# Patient Record
Sex: FEMALE
[De-identification: names, ages, dates, MRNs, and addresses within clinical notes are randomized per-mention and may not be internally consistent; named-entity substitution may affect disease eponyms.]

---

## 2023-08-14 ENCOUNTER — DASHBOARD ENCOUNTERS (OUTPATIENT)
Age: 63
End: 2023-08-14

## 2023-08-14 ENCOUNTER — WEB ENCOUNTER (OUTPATIENT)
Dept: URBAN - METROPOLITAN AREA CLINIC 98 | Facility: CLINIC | Age: 63
End: 2023-08-14

## 2023-08-14 ENCOUNTER — OFFICE VISIT (OUTPATIENT)
Dept: URBAN - METROPOLITAN AREA CLINIC 98 | Facility: CLINIC | Age: 63
End: 2023-08-14
Payer: COMMERCIAL

## 2023-08-14 VITALS
SYSTOLIC BLOOD PRESSURE: 120 MMHG | TEMPERATURE: 97.4 F | BODY MASS INDEX: 23.43 KG/M2 | HEART RATE: 53 BPM | DIASTOLIC BLOOD PRESSURE: 69 MMHG | WEIGHT: 145.8 LBS | HEIGHT: 66 IN

## 2023-08-14 DIAGNOSIS — Z12.11 COLON CANCER SCREENING: ICD-10-CM

## 2023-08-14 DIAGNOSIS — K64.8 HEMORRHOIDS, INTERNAL: ICD-10-CM

## 2023-08-14 PROCEDURE — 99203 OFFICE O/P NEW LOW 30 MIN: CPT | Performed by: INTERNAL MEDICINE

## 2023-08-14 RX ORDER — WHEAT DEXTRIN 3 G/3.5 G
AS DIRECTED POWDER (GRAM) ORAL
OUTPATIENT

## 2023-08-14 RX ORDER — ZINC GLUCONATE 50 MG
1 TABLET TABLET ORAL ONCE A DAY
Status: ACTIVE | COMMUNITY

## 2023-08-14 RX ORDER — SODIUM, POTASSIUM,MAG SULFATES 17.5-3.13G
354ML SOLUTION, RECONSTITUTED, ORAL ORAL ONCE
Qty: 354 MILLILITER | Refills: 0 | OUTPATIENT

## 2023-08-14 NOTE — HPI-TODAY'S VISIT:
[facility] GERD that has resolved Due for colonoscopy Never had one before No change in bowel habits No blood in stool Occ hemorrhoids

## 2023-09-15 ENCOUNTER — OFFICE VISIT (OUTPATIENT)
Dept: URBAN - METROPOLITAN AREA SURGERY CENTER 18 | Facility: SURGERY CENTER | Age: 63
End: 2023-09-15
Payer: COMMERCIAL

## 2023-09-15 ENCOUNTER — CLAIMS CREATED FROM THE CLAIM WINDOW (OUTPATIENT)
Dept: URBAN - METROPOLITAN AREA SURGERY CENTER 18 | Facility: SURGERY CENTER | Age: 63
End: 2023-09-15

## 2023-09-15 ENCOUNTER — CLAIMS CREATED FROM THE CLAIM WINDOW (OUTPATIENT)
Dept: URBAN - METROPOLITAN AREA CLINIC 4 | Facility: CLINIC | Age: 63
End: 2023-09-15
Payer: COMMERCIAL

## 2023-09-15 DIAGNOSIS — Z12.11 COLON CANCER SCREENING (HIGH RISK): ICD-10-CM

## 2023-09-15 DIAGNOSIS — D12.3 BENIGN NEOPLASM OF TRANSVERSE COLON: ICD-10-CM

## 2023-09-15 DIAGNOSIS — Z12.11 COLON CANCER SCREENING: ICD-10-CM

## 2023-09-15 DIAGNOSIS — D12.5 ADENOMATOUS POLYP OF SIGMOID COLON: ICD-10-CM

## 2023-09-15 DIAGNOSIS — D12.2 ADENOMA OF ASCENDING COLON: ICD-10-CM

## 2023-09-15 DIAGNOSIS — D12.3 ADENOMA OF TRANSVERSE COLON: ICD-10-CM

## 2023-09-15 DIAGNOSIS — D12.3 ADENOMATOUS POLYP OF TRANSVERSE COLON: ICD-10-CM

## 2023-09-15 DIAGNOSIS — D12.5 ADENOMA OF SIGMOID COLON: ICD-10-CM

## 2023-09-15 DIAGNOSIS — D12.2 ADENOMATOUS POLYP OF ASCENDING COLON: ICD-10-CM

## 2023-09-15 PROCEDURE — 88305 TISSUE EXAM BY PATHOLOGIST: CPT | Performed by: PATHOLOGY

## 2023-09-15 PROCEDURE — 45380 COLONOSCOPY AND BIOPSY: CPT | Performed by: INTERNAL MEDICINE

## 2023-09-15 PROCEDURE — 00811 ANES LWR INTST NDSC NOS: CPT | Performed by: NURSE ANESTHETIST, CERTIFIED REGISTERED

## 2023-09-15 PROCEDURE — G8907 PT DOC NO EVENTS ON DISCHARG: HCPCS | Performed by: INTERNAL MEDICINE

## 2023-09-15 PROCEDURE — 45381 COLONOSCOPY SUBMUCOUS NJX: CPT | Performed by: INTERNAL MEDICINE

## 2023-09-15 PROCEDURE — 45385 COLONOSCOPY W/LESION REMOVAL: CPT | Performed by: INTERNAL MEDICINE

## 2023-09-15 RX ORDER — ZINC GLUCONATE 50 MG
1 TABLET TABLET ORAL ONCE A DAY
Status: ACTIVE | COMMUNITY

## 2023-09-15 RX ORDER — SODIUM, POTASSIUM,MAG SULFATES 17.5-3.13G
354ML SOLUTION, RECONSTITUTED, ORAL ORAL ONCE
Qty: 354 MILLILITER | Refills: 0 | Status: ACTIVE | COMMUNITY

## 2023-09-15 RX ORDER — WHEAT DEXTRIN 3 G/3.5 G
AS DIRECTED POWDER (GRAM) ORAL
Status: ACTIVE | COMMUNITY

## 2023-09-16 LAB
A/G RATIO: 1.6
ALBUMIN: 4.2
ALKALINE PHOSPHATASE: 67
ALT (SGPT): 15
AST (SGOT): 23
BASO (ABSOLUTE): 0
BASOS: 1
BILIRUBIN, TOTAL: 0.4
BUN/CREATININE RATIO: 12
BUN: 10
CA 19-9: 8
CALCIUM: 9.3
CARBON DIOXIDE, TOTAL: 23
CEA: 1.3
CHLORIDE: 111
CREATININE: 0.81
EGFR: 81
EOS (ABSOLUTE): 0.1
EOS: 1
GLOBULIN, TOTAL: 2.6
GLUCOSE: 111
HEMATOCRIT: 40.4
HEMATOLOGY COMMENTS:: (no result)
HEMOGLOBIN: 13.3
IMMATURE CELLS: (no result)
IMMATURE GRANS (ABS): 0
IMMATURE GRANULOCYTES: 0
LYMPHS (ABSOLUTE): 0.9
LYMPHS: 14
MCH: 30.6
MCHC: 32.9
MCV: 93
MONOCYTES(ABSOLUTE): 0.6
MONOCYTES: 10
NEUTROPHILS (ABSOLUTE): 4.4
NEUTROPHILS: 74
NRBC: (no result)
PLATELETS: 287
POTASSIUM: 4.6
PROTEIN, TOTAL: 6.8
RBC: 4.35
RDW: 12
SODIUM: 146
WBC: 6

## 2024-09-24 ENCOUNTER — OFFICE VISIT (OUTPATIENT)
Dept: URBAN - METROPOLITAN AREA CLINIC 98 | Facility: CLINIC | Age: 64
End: 2024-09-24
Payer: MEDICARE

## 2024-09-24 VITALS
DIASTOLIC BLOOD PRESSURE: 70 MMHG | TEMPERATURE: 96.8 F | WEIGHT: 152.4 LBS | BODY MASS INDEX: 24.49 KG/M2 | SYSTOLIC BLOOD PRESSURE: 107 MMHG | HEART RATE: 57 BPM | HEIGHT: 66 IN

## 2024-09-24 DIAGNOSIS — Z12.11 COLON CANCER SCREENING: ICD-10-CM

## 2024-09-24 DIAGNOSIS — K63.89 COLONIC MASS: ICD-10-CM

## 2024-09-24 DIAGNOSIS — K64.8 HEMORRHOIDS, INTERNAL: ICD-10-CM

## 2024-09-24 DIAGNOSIS — Z85.038 PERSONAL HISTORY OF COLON CANCER: ICD-10-CM

## 2024-09-24 DIAGNOSIS — Z86.010 PERSONAL HISTORY OF COLONIC POLYPS: ICD-10-CM

## 2024-09-24 PROBLEM — 429699009: Status: ACTIVE | Noted: 2024-09-24

## 2024-09-24 PROBLEM — 428283002: Status: ACTIVE | Noted: 2024-09-24

## 2024-09-24 PROCEDURE — 99214 OFFICE O/P EST MOD 30 MIN: CPT | Performed by: INTERNAL MEDICINE

## 2024-09-24 RX ORDER — WHEAT DEXTRIN 3 G/3.5 G
AS DIRECTED POWDER (GRAM) ORAL
OUTPATIENT

## 2024-09-24 RX ORDER — ZINC GLUCONATE 50 MG
1 TABLET TABLET ORAL ONCE A DAY
Status: ACTIVE | COMMUNITY

## 2024-09-24 RX ORDER — SODIUM, POTASSIUM,MAG SULFATES 17.5-3.13G
354ML SOLUTION, RECONSTITUTED, ORAL ORAL ONCE
Qty: 354 MILLILITER | Refills: 0 | Status: ON HOLD | COMMUNITY

## 2024-09-24 RX ORDER — WHEAT DEXTRIN 3 G/3.5 G
AS DIRECTED POWDER (GRAM) ORAL
Status: ON HOLD | COMMUNITY

## 2024-09-24 NOTE — HPI-TODAY'S VISIT:
f/u visit: I found CRC last year 9/2023.  She is s/p with stage I disease found (see path below) She is feeling well at this time No change in bowel habits  . CRC Path 2023: Right colon, terminal ileum and appendix; right hemicolectomy:              ? 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 Tubular adenoma of the colon              ? Terminal ileum without significant histopathologic change              ? Appendix without significant histopathologic change \~ Pathologic stage: pT2 N0 . PRIOR VISIT: [facility] GERD that has resolved Due for colonoscopy Never had one before No change in bowel habits No blood in stool Occ hemorrhoids

## 2024-10-24 ENCOUNTER — CLAIMS CREATED FROM THE CLAIM WINDOW (OUTPATIENT)
Dept: URBAN - METROPOLITAN AREA SURGERY CENTER 18 | Facility: SURGERY CENTER | Age: 64
End: 2024-10-24
Payer: MEDICARE

## 2024-10-24 ENCOUNTER — CLAIMS CREATED FROM THE CLAIM WINDOW (OUTPATIENT)
Dept: URBAN - METROPOLITAN AREA CLINIC 4 | Facility: CLINIC | Age: 64
End: 2024-10-24
Payer: MEDICARE

## 2024-10-24 ENCOUNTER — OFFICE VISIT (OUTPATIENT)
Dept: URBAN - METROPOLITAN AREA SURGERY CENTER 18 | Facility: SURGERY CENTER | Age: 64
End: 2024-10-24

## 2024-10-24 DIAGNOSIS — D12.5 ADENOMATOUS POLYP OF SIGMOID COLON: ICD-10-CM

## 2024-10-24 DIAGNOSIS — K57.30 DIVERTICULOSIS OF SIGMOID COLON: ICD-10-CM

## 2024-10-24 DIAGNOSIS — Z86.0100 PERSONAL HISTORY OF COLONIC POLYPS: ICD-10-CM

## 2024-10-24 DIAGNOSIS — D12.5 BENIGN NEOPLASM OF SIGMOID COLON: ICD-10-CM

## 2024-10-24 PROCEDURE — 00811 ANES LWR INTST NDSC NOS: CPT | Performed by: NURSE ANESTHETIST, CERTIFIED REGISTERED

## 2024-10-24 PROCEDURE — 88305 TISSUE EXAM BY PATHOLOGIST: CPT | Performed by: PATHOLOGY

## 2024-10-24 RX ORDER — ZINC GLUCONATE 50 MG
1 TABLET TABLET ORAL ONCE A DAY
Status: ACTIVE | COMMUNITY

## 2024-10-24 RX ORDER — WHEAT DEXTRIN 3 G/3.5 G
AS DIRECTED POWDER (GRAM) ORAL
Status: ACTIVE | COMMUNITY

## 2024-10-24 RX ORDER — SODIUM, POTASSIUM,MAG SULFATES 17.5-3.13G
354ML SOLUTION, RECONSTITUTED, ORAL ORAL ONCE
Qty: 354 MILLILITER | Refills: 0 | Status: ON HOLD | COMMUNITY